# Patient Record
(demographics unavailable — no encounter records)

---

## 2024-10-16 NOTE — PLAN
[FreeTextEntry1] : 68-year-old female, diabetes, hypertension, atrial fibrillation on anticoagulation, s/p cardiac ablation, referred by Dr. Anthony Hogan for second opinion.  -History and workup reviewed as above - Records that are available for review do not show evidence of inflammatory bowel disease.  Discussed with patient. - As she currently has had resolution of diarrhea and improvement in abdominal pain, would recommend conservative management with observation for now. - Would repeat EGD/colonoscopy/MRI in 1 year, sooner if any symptoms - Can consider capsule endoscopy if high suspicion for IBD remains - If recurrent loose stools can also consider checking fecal elastase for pancreatic insufficiency - Follow-up with Dr. Hogan, RTC as needed

## 2024-10-16 NOTE — HISTORY OF PRESENT ILLNESS
[Home] : at home, [unfilled] , at the time of the visit. [Verbal consent obtained from patient] : the patient, [unfilled] [FreeTextEntry1] : 68-year-old female, diabetes, hypertension, atrial fibrillation on anticoagulation, s/p cardiac ablation, referred by Dr. Anthony Hogan for second opinion.  Patient states that she has had longstanding episodic diarrhea but this has progressively worsened over the past year. She reports increased frequency of bowel movements with up to 10 bowel movements per day. No blood in stool + Weight loss She followed up with Dr. Hogan, initial CT scan reportedly showed inflammation.  This scan is not available. She was started on budesonide with mild improvement in symptoms She subsequently underwent EGD/colonoscopy and MRI as below--  Cscope May 2024 - normal TI, four 2-3mm polyps in transverse colon, altered mucosa in cecum, lipoma at hepatic flexure, internal hemorrhoids.  Pathology-tubular adenoma x 4, otherwise normal colonic and small intestinal mucosa MRE - June 2024 - no mr evidence of active inflammatory bowel disease  Workup was without any clear evidence of IBD. She has since been tapered off of budesonide.  Last dose in July 2024. Currently has 2 bowel movements per day. No blood in stool. Intermittent abdominal pain and nausea for which she is on omeprazole. No current weight loss. No prior EGD  fhx -  mom - colon cancer 70s  maternal aunt - colon cancer 50s  grandmother - panc cancer 70s  No family history of IBD, celiac disease    [Time Spent: ___ minutes] : I have spent [unfilled] minutes with the patient on the telephone